# Patient Record
Sex: FEMALE | Race: WHITE | Employment: OTHER | URBAN - METROPOLITAN AREA
[De-identification: names, ages, dates, MRNs, and addresses within clinical notes are randomized per-mention and may not be internally consistent; named-entity substitution may affect disease eponyms.]

---

## 2017-04-18 ENCOUNTER — TRANSFERRED RECORDS (OUTPATIENT)
Dept: HEALTH INFORMATION MANAGEMENT | Facility: CLINIC | Age: 67
End: 2017-04-18

## 2017-04-26 ENCOUNTER — OFFICE VISIT (OUTPATIENT)
Dept: INTERNAL MEDICINE | Facility: CLINIC | Age: 67
End: 2017-04-26
Payer: COMMERCIAL

## 2017-04-26 VITALS
WEIGHT: 124 LBS | DIASTOLIC BLOOD PRESSURE: 89 MMHG | TEMPERATURE: 98.3 F | SYSTOLIC BLOOD PRESSURE: 121 MMHG | OXYGEN SATURATION: 98 % | HEART RATE: 94 BPM

## 2017-04-26 DIAGNOSIS — F17.200 TOBACCO USE DISORDER: ICD-10-CM

## 2017-04-26 DIAGNOSIS — Z87.898 HISTORY OF SEIZURE: ICD-10-CM

## 2017-04-26 DIAGNOSIS — I63.9 CEREBROVASCULAR ACCIDENT (CVA), UNSPECIFIED MECHANISM (H): ICD-10-CM

## 2017-04-26 DIAGNOSIS — C34.91 SMALL CELL LUNG CARCINOMA, RIGHT (H): Primary | ICD-10-CM

## 2017-04-26 PROCEDURE — 99204 OFFICE O/P NEW MOD 45 MIN: CPT | Performed by: INTERNAL MEDICINE

## 2017-04-26 RX ORDER — LEVETIRACETAM 500 MG/1
500 TABLET ORAL 2 TIMES DAILY
COMMUNITY

## 2017-04-26 RX ORDER — BUPROPION HYDROCHLORIDE 150 MG/1
150 TABLET, FILM COATED, EXTENDED RELEASE ORAL 2 TIMES DAILY
COMMUNITY

## 2017-04-26 RX ORDER — ONDANSETRON 4 MG/1
8 TABLET, ORALLY DISINTEGRATING ORAL EVERY 8 HOURS PRN
COMMUNITY

## 2017-04-26 RX ORDER — AMLODIPINE AND BENAZEPRIL HYDROCHLORIDE 5; 10 MG/1; MG/1
1 CAPSULE ORAL DAILY
COMMUNITY

## 2017-04-26 RX ORDER — ALBUTEROL SULFATE 90 UG/1
2 AEROSOL, METERED RESPIRATORY (INHALATION) EVERY 6 HOURS
COMMUNITY

## 2017-04-26 NOTE — NURSING NOTE
Chief Complaint   Patient presents with     Consult     establish care        Initial /89  Pulse 94  Temp 98.3  F (36.8  C) (Oral)  Wt 124 lb (56.2 kg)  SpO2 98% There is no height or weight on file to calculate BMI.  Medication Reconciliation: complete

## 2017-04-26 NOTE — PATIENT INSTRUCTIONS
Please ask your Oncologist tomorrow if Prevnar 13 is safe for you to take know. (this is a pneumonia vaccine).     Please continue to follow-up with Oncology.    You have indicated that you will try to use Nicotine gum to help you quit smoking. Please also see below and let us know if you cannot quit in the next 3-4 weeks, we can consider other options.      We advise the next visit in 3 months.      Planning to Quit Smoking  Your healthcare provider may have told you that you need to give up tobacco. Only you can decide if and when you are ready to quit. Quitting is hard to do. But the benefits will be worth it. When you  decide to quit, come up with a plan that s right for you. Discuss your plan with your healthcare provider. And talk to your provider about medicines to help you quit.    Line up support  To quit smoking, you ll need a plan and some help. Pick a date within the next 2 to 4 weeks to quit. Use the time between now and that date to arrange for support.    Classes and counselors. Quit-smoking classes  people like you through the process. Get to know others in a class, and support each other beyond the class. Telephone counseling also helps you keep on track. Ask your healthcare provider, local hospital, or public health department to put you in touch with a class and a phone counselor.    Family and friends. Tell your family and friends about your quit date. Ask them to support your change. If they smoke, arrange to see them in smoke-free places. Forbid smoking in your home.     Finding something to replace cigarettes may be hard to do. Be aware that some things you choose may be as harmful as cigarettes:    Smokeless (chewing) tobacco is just as harmful as regular tobacco. Tobacco should not be used as a substitute for cigarettes.    Herbal medicines or teas may affect how your body handles nicotine. Talk to your healthcare provider before using these products.    E-cigarettes have less toxins  than the smoke from a regular cigarette. But the FDA says that these devices may still have substances that can cause cancer. E-cigarettes are not well regulated. They have not been studied enough to know if they are a good aid to help you stop smoking. Talk with your healthcare provider befor using these products.      Quit-smoking products  Many products can help you quit smoking. Some are prescription medicines that help curb your cravings and withdrawal symptoms. Other products slowly lessen the level of nicotine your body absorbs. Nicotine is the highly addictive substance found in cigarettes, cigars, and chewing tobacco. Nicotine replacement products can help get your body used to slowly decreasing amounts of nicotine after you quit smoking. These products include a nicotine patch, gum, lozenge, nasal spray, and inhaler. Be sure you follow the directions for your medicine or product carefully. Your healthcare provider may tell you to start taking the prescription medicine a week before you plan to quit. Do not smoke while you use nicotine products. Doing so can be very harmful to your health.  For more information    smokefree.gov/guow-hv-tq-expert    National Cancer [place] Smoking Quitline: 877-44U-QUIT (316-623-6078)     0524-8317 [employer]. [address] [place] [address]. All rights reserved. This information is not intended as a substitute for professional medical care. Always follow your healthcare professional's instructions.        Tips for Quitting Smoking (Cardiovascular)  Quitting smoking is a gift to yourself, one of the best things you can do to keep your heart disease from getting worse. Smoking reduces oxygen flow to your heart, speeds the buildup of plaque, and increases your risk for heart attack, also known as acute myocardial infarction, or AMI. Quitting helps reduce smoking's harmful effects. You may have tried to quit before, but don t give up. Try again. Many  smokers try four or five times before they succeed.     You ll have the best chance of success if you join a stop-smoking group and have the support of family and friends.     Line up help    Ask for the support of your family and friends.    Join a quit-smoking class, or ask your health care provider for a referral to a psychologist who specializes in helping people quit smoking.     Ask your health care provider about nicotine replacement products and prescription medications that can help you quit.  Set a quit date    Choose a date within the next 2 to 4 weeks.    After picking a day, tatum it in bold letters on a calendar.     Your quit list  Start by giving up cigarettes at the times you least need them. Write down a few more ideas.     Set limits    Limit where you can smoke. Pick one room or a porch, and smoke only in that place.    Make smoking outdoors a house rule. Other smokers won t tempt you as much.    Hang a list of   quit benefits  in the spot where you smoke. Put one on the refrigerator and one on your car dashboard.     For more information    smokefree.gov/mxtb-ip-un-expert    National Cancer [place] Smoking Quitline: 877-44U-QUIT (622-066-6362)        7847-6451 [employer]. [address] [place] [address], [place] [address]. All rights reserved. This information is not intended as a substitute for professional medical care. Always follow your healthcare professional's instructions.

## 2017-04-26 NOTE — MR AVS SNAPSHOT
After Visit Summary   4/26/2017    Rayna Ya    MRN: [id number]           Patient Information     Date Of Birth          1950        Visit Information        Provider Department      4/26/2017 4:40 PM Bronwyn Zamorano MD [facility]        Today's Diagnoses     Cerebrovascular accident (CVA), unspecified mechanism (H)        Small cell lung carcinoma, right (H)        History of seizure          Care Instructions    Please ask your Oncologist tomorrow if Prevnar 13 is safe for you to take know. (this is a pneumonia vaccine).     Please continue to follow-up with Oncology.    You have indicated that you will try to use Nicotine gum to help you quit smoking. Please also see below and let us know if you cannot quit in the next 3-4 weeks, we can consider other options.      We advise the next visit in 3 months.      Planning to Quit Smoking  Your healthcare provider may have told you that you need to give up tobacco. Only you can decide if and when you are ready to quit. Quitting is hard to do. But the benefits will be worth it. When you  decide to quit, come up with a plan that s right for you. Discuss your plan with your healthcare provider. And talk to your provider about medicines to help you quit.    Line up support  To quit smoking, you ll need a plan and some help. Pick a date within the next 2 to 4 weeks to quit. Use the time between now and that date to arrange for support.    Classes and counselors. Quit-smoking classes  people like you through the process. Get to know others in a class, and support each other beyond the class. Telephone counseling also helps you keep on track. Ask your healthcare provider, local hospital, or public health department to put you in touch with a class and a phone counselor.    Family and friends. Tell your family and friends about your quit date. Ask them to support your change. If they smoke, arrange to see them in  smoke-free places. Forbid smoking in your home.     Finding something to replace cigarettes may be hard to do. Be aware that some things you choose may be as harmful as cigarettes:    Smokeless (chewing) tobacco is just as harmful as regular tobacco. Tobacco should not be used as a substitute for cigarettes.    Herbal medicines or teas may affect how your body handles nicotine. Talk to your healthcare provider before using these products.    E-cigarettes have less toxins than the smoke from a regular cigarette. But the FDA says that these devices may still have substances that can cause cancer. E-cigarettes are not well regulated. They have not been studied enough to know if they are a good aid to help you stop smoking. Talk with your healthcare provider befor using these products.      Quit-smoking products  Many products can help you quit smoking. Some are prescription medicines that help curb your cravings and withdrawal symptoms. Other products slowly lessen the level of nicotine your body absorbs. Nicotine is the highly addictive substance found in cigarettes, cigars, and chewing tobacco. Nicotine replacement products can help get your body used to slowly decreasing amounts of nicotine after you quit smoking. These products include a nicotine patch, gum, lozenge, nasal spray, and inhaler. Be sure you follow the directions for your medicine or product carefully. Your healthcare provider may tell you to start taking the prescription medicine a week before you plan to quit. Do not smoke while you use nicotine products. Doing so can be very harmful to your health.  For more information    smokefree.gov/seta-wb-bq-expert    National Cancer [place] Smoking Quitline: 877-44U-QUIT (575-221-8163)     1895-6165 [employer]. [address] [place] [address], [place] [address]. All rights reserved. This information is not intended as a substitute for professional medical care. Always follow your healthcare  professional's instructions.        Tips for Quitting Smoking (Cardiovascular)  Quitting smoking is a gift to yourself, one of the best things you can do to keep your heart disease from getting worse. Smoking reduces oxygen flow to your heart, speeds the buildup of plaque, and increases your risk for heart attack, also known as acute myocardial infarction, or AMI. Quitting helps reduce smoking's harmful effects. You may have tried to quit before, but don t give up. Try again. Many smokers try four or five times before they succeed.     You ll have the best chance of success if you join a stop-smoking group and have the support of family and friends.     Line up help    Ask for the support of your family and friends.    Join a quit-smoking class, or ask your health care provider for a referral to a psychologist who specializes in helping people quit smoking.     Ask your health care provider about nicotine replacement products and prescription medications that can help you quit.  Set a quit date    Choose a date within the next 2 to 4 weeks.    After picking a day, tatum it in bold letters on a calendar.     Your quit list  Start by giving up cigarettes at the times you least need them. Write down a few more ideas.     Set limits    Limit where you can smoke. Pick one room or a porch, and smoke only in that place.    Make smoking outdoors a house rule. Other smokers won t tempt you as much.    Hang a list of   quit benefits  in the spot where you smoke. Put one on the refrigerator and one on your car dashboard.     For more information    smokefree.gov/zsqu-sp-ik-expert    National Cancer [place] Smoking Quitline: 877-44U-QUIT (657-270-7799)        4682-5674 [employer]. [address] [place] [address], [place] [address]. All rights reserved. This information is not intended as a substitute for professional medical care. Always follow your healthcare professional's instructions.              Follow-ups after  "your visit        Who to contact     If you have questions or need follow up information about today's clinic visit or your schedule please contact [facility] directly at [phone].  Normal or non-critical lab and imaging results will be communicated to you by MyChart, letter or phone within 4 business days after the clinic has received the results. If you do not hear from us within 7 days, please contact the clinic through MyChart or phone. If you have a critical or abnormal lab result, we will notify you by phone as soon as possible.  Submit refill requests through [employer] or call your pharmacy and they will forward the refill request to us. Please allow 3 business days for your refill to be completed.          Additional Information About Your Visit        [employer] Information     [employer] lets you send messages to your doctor, view your test results, renew your prescriptions, schedule appointments and more. To sign up, go to [email or web address] . Click on \"Log in\" on the left side of the screen, which will take you to the Welcome page. Then click on \"Sign up Now\" on the right side of the page.     You will be asked to enter the access code listed below, as well as some personal information. Please follow the directions to create your username and password.     Your access code is: [email or web address]  Expires: 2017  5:45 PM     Your access code will  in 90 days. If you need help or a new code, please call your [place] clinic or [phone].        Care EveryWhere ID     This is your Care EveryWhere ID. This could be used by other organizations to access your [place] medical records  [id number]        Your Vitals Were     Pulse Temperature Pulse Oximetry             94 98.3  F (36.8  C) (Oral) 98%          Blood Pressure from Last 3 Encounters:   17 121/89   14 (!) 151/94    Weight from Last 3 Encounters:   17 124 lb (56.2 kg)              Today, you had the following "     No orders found for display         Today's Medication Changes          These changes are accurate as of: 4/26/17  5:45 PM.  If you have any questions, ask your nurse or doctor.               These medicines have changed or have updated prescriptions.        Dose/Directions    acetaminophen 325 MG tablet   Commonly known as:  TYLENOL   This may have changed:  Another medication with the same name was removed. Continue taking this medication, and follow the directions you see here.        Dose:  1-2 tablet   Take 1-2 tablets by mouth every 4 hours as needed   Refills:  0         Stop taking these medicines if you haven't already. Please contact your care team if you have questions.     aspirin 81 MG tablet   Stopped by:  Bronwyn Zamorano MD           CEPACOL MT   Stopped by:  Bronwyn Zamorano MD           CITRUCEL PO   Stopped by:  Bronwyn Zamorano MD           guaiFENesin 20 mg/mL Soln solution   Commonly known as:  ROBITUSSIN   Stopped by:  Bronwyn Zamorano MD           lisinopril 20 MG tablet   Commonly known as:  PRINIVIL/ZESTRIL   Stopped by:  Bronwyn Zamorano MD           LOPERAMIDE A-D 2 MG tablet   Generic drug:  loperamide   Stopped by:  Bronwyn Zamorano MD           loratadine 10 MG tablet   Commonly known as:  CLARITIN   Stopped by:  Bronwyn Zamorano MD           MAG-AL PLUS XS PO   Stopped by:  Bronwyn Zamorano MD           MILK OF MAGNESIA PO   Stopped by:  Bronwyn Zamorano MD           MULTIVITAMIN PO   Stopped by:  Bronwyn Zamorano MD           oxybutynin 10 MG 24 hr tablet   Commonly known as:  DITROPAN XL   Stopped by:  Bronwyn Zamorano MD           PAXIL 40 MG tablet   Generic drug:  PARoxetine   Stopped by:  Bronwyn Zamorano MD           simvastatin 40 MG tablet   Commonly known as:  ZOCOR   Stopped by:  Bronwyn Zamorano MD           STOOL  SOFTENER 100 MG capsule   Generic drug:  docusate sodium   Stopped by:  Bronwyn Zamorano MD           triamterene-hydrochlorothiazide 37.5-25 MG per tablet   Commonly known as:  MAXZIDE-25   Stopped by:  Bronwyn Zamorano MD                    Primary Care Provider    None Specified       No primary provider on file.        Thank you!     Thank you for choosing [facility]  [facility] your [facility]. Our goal is always to provide you with excellent care. Hearing back from our patients is one way we can continue to improve our services. Please take a few minutes to complete the written survey that you may receive in the mail after your visit with us. Thank you!             Your Updated Medication List - Protect others around you: Learn how to safely use, store and throw away your medicines at www.disposemymeds.org.          This list is accurate as of: 4/26/17  5:45 PM.  Always use your most recent med list.                   Brand Name Dispense Instructions for use    acetaminophen 325 MG tablet    TYLENOL     Take 1-2 tablets by mouth every 4 hours as needed       albuterol 108 (90 BASE) MCG/ACT Inhaler    PROAIR HFA/PROVENTIL HFA/VENTOLIN HFA     Inhale 2 puffs into the lungs every 6 hours       amLODIPine-benazepril 5-10 MG per capsule    LOTREL     Take 1 capsule by mouth daily       buPROPion 150 MG 12 hr tablet    ZYBAN     Take 150 mg by mouth 2 times daily       levETIRAcetam 500 MG tablet    KEPPRA     Take 500 mg by mouth 2 times daily       LIPITOR PO          MIRTAZAPINE PO      Take 15 mg by mouth At Bedtime       ondansetron 4 MG ODT tab    ZOFRAN-ODT     Take 8 mg by mouth every 8 hours as needed for nausea       PROCHLORPERAZINE MALEATE PO      Take 10 mg by mouth       TOPROL  MG 24 hr tablet   Generic drug:  metoprolol      Take 100 mg by mouth daily       VITAMIN D (CHOLECALCIFEROL) PO      Take 2,000 Units by mouth daily

## 2017-04-26 NOTE — PROGRESS NOTES
SUBJECTIVE:                                                    Rayna Ya is a 66 year old female who presents to clinic today for the following health issues:      Looking for a new pcp to help with medications.  Daughter is here and helps provide details of the patient's PMH.       SCC of the lung, recently diagnosed.  History of past ETOH use.  She was dxed with lung cancer (with small cell lung cancer) a few weeks ago, and finished her last chemo cycle of Etoposide and Carboplatin about 10 days ago.  She is getting treatment through [facility].  She tolerated this well.     She is already feeling better.  She is a current smoker and has just bought nicotine gum.  She smokes about 3/4 of a pack per day.  She has labs tomorrow. She is due to start another round of chemo on 5/10/17.   She has no f/c.  She does endorse diarrhea-or actually, just loose stools about twice a day.  No nausea.   History of Wernicke Korsakoff syndrome-last ETOH was 2 months ago in [place]-was advised to avoid completely.     Problem list and histories reviewed & adjusted, as indicated.  Additional history: as documented    Patient Active Problem List   Diagnosis     Chronic obstructive pulmonary disease (H)     Chronic kidney disease, stage III (moderate)     Essential hypertension     Hyperlipidemia     Moderate episode of recurrent major depressive disorder (H)     Paroxysmal atrial fibrillation (H)     Tobacco use     Anxiety state     Anemia     Atopic rhinitis     Stroke (H)     Mild cognitive disorder     Small cell lung carcinoma, right (H)     History of seizure     Past Surgical History:   Procedure Laterality Date     HYSTERECTOMY         Social History   Substance Use Topics     Smoking status: Former Smoker     Packs/day: 1.00     Types: Cigarettes     Quit date: 1/7/2014     Smokeless tobacco: Never Used     Alcohol use Not on file     Family History   Problem Relation Age of Onset     Depression Mother      Anxiety  Disorder Mother      Substance Abuse Father      Substance Abuse Maternal Grandfather      Substance Abuse Paternal Grandfather      Substance Abuse Brother          Current Outpatient Prescriptions   Medication Sig Dispense Refill     MIRTAZAPINE PO Take 15 mg by mouth At Bedtime       levETIRAcetam (KEPPRA) 500 MG tablet Take 500 mg by mouth 2 times daily       buPROPion (ZYBAN) 150 MG 12 hr tablet Take 150 mg by mouth 2 times daily       amLODIPine-benazepril (LOTREL) 5-10 MG per capsule Take 1 capsule by mouth daily       albuterol (PROAIR HFA/PROVENTIL HFA/VENTOLIN HFA) 108 (90 BASE) MCG/ACT Inhaler Inhale 2 puffs into the lungs every 6 hours       Atorvastatin Calcium (LIPITOR PO)        VITAMIN D, CHOLECALCIFEROL, PO Take 2,000 Units by mouth daily       PROCHLORPERAZINE MALEATE PO Take 10 mg by mouth       ondansetron (ZOFRAN-ODT) 4 MG ODT tab Take 8 mg by mouth every 8 hours as needed for nausea       acetaminophen (TYLENOL) 325 MG tablet Take 1-2 tablets by mouth every 4 hours as needed       metoprolol (TOPROL XL) 100 MG 24 hr tablet Take 100 mg by mouth daily         Reviewed and updated as needed this visit by clinical staff  Tobacco  Allergies  Meds  Med Hx  Surg Hx  Fam Hx  Soc Hx      Reviewed and updated as needed this visit by Provider           ==============================================================  ROS:  Constitutional, HEENT, cardiovascular, pulmonary, GI, , musculoskeletal, neuro, skin, endocrine and psych systems are negative, except as otherwise noted.       OBJECTIVE:                                                    /89  Pulse 94  Temp 98.3  F (36.8  C) (Oral)  Wt 124 lb (56.2 kg)  SpO2 98%  There is no height or weight on file to calculate BMI.     GENERAL APPEARANCE: healthy, alert and in no distress  EYES: Eyes grossly normal to inspection, and conjunctivae and sclerae normal  HENT: head normocephalic and atraumatic and mouth without ulcers or lesions,  oropharynx clear and oral mucous membranes moist  NECK: no noticeable adenopathy, no asymmetry, masses, or scars   RESP:   lungs clear to auscultation - no rales, rhonchi or wheezes  CV: regular rate and rhythm, normal S1 S2, no S3 or S4, no murmur, click or rub, no peripheral edema and peripheral pulses strong  ABDOMEN: soft, nontender, no hepatosplenomegaly, no masses and bowel sounds normal  MS: no musculoskeletal defects are noted and gait is age appropriate without ataxia  SKIN: no suspicious lesions or rashes  NEURO: mentation intact and speech normal  PSYCH: mentation appears normal and affect normal/bright.          ASSESSMENT/PLAN:                                                        ICD-10-CM    1. Small cell lung carcinoma, right (H) C34.91    2. Tobacco use disorder F17.200    3. Cerebrovascular accident (CVA), unspecified mechanism (H) I63.9    4. History of seizure Z87.898      (C34.91) Small cell lung carcinoma, right (H)  (primary encounter diagnosis)  Comment: recently dxed-see HPI  Plan: continue follow-up with specialists    (F17.200) Tobacco use disorder  Comment: as noted, with recent lung SCC dx  Plan: As per orders above and patient instructions below.     (I63.9) Cerebrovascular accident (CVA), unspecified mechanism (H)  Comment: in the past  Plan: Continue current regimen.     (Z87.898) History of seizure  Comment: remote (perhaps related to ETOH use in the past)-no recent ETOH use  Plan: Continue current regimen.        Patient Instructions     Please ask your Oncologist tomorrow if Prevnar 13 is safe for you to take know. (this is a pneumonia vaccine).     Please continue to follow-up with Oncology.    You have indicated that you will try to use Nicotine gum to help you quit smoking. Please also see below and let us know if you cannot quit in the next 3-4 weeks, we can consider other options.      We advise the next visit in 3 months.      Planning to Quit Smoking  Your healthcare  provider may have told you that you need to give up tobacco. Only you can decide if and when you are ready to quit. Quitting is hard to do. But the benefits will be worth it. When you  decide to quit, come up with a plan that s right for you. Discuss your plan with your healthcare provider. And talk to your provider about medicines to help you quit.    Line up support  To quit smoking, you ll need a plan and some help. Pick a date within the next 2 to 4 weeks to quit. Use the time between now and that date to arrange for support.    Classes and counselors. Quit-smoking classes  people like you through the process. Get to know others in a class, and support each other beyond the class. Telephone counseling also helps you keep on track. Ask your healthcare provider, local hospital, or public health department to put you in touch with a class and a phone counselor.    Family and friends. Tell your family and friends about your quit date. Ask them to support your change. If they smoke, arrange to see them in smoke-free places. Forbid smoking in your home.     Finding something to replace cigarettes may be hard to do. Be aware that some things you choose may be as harmful as cigarettes:    Smokeless (chewing) tobacco is just as harmful as regular tobacco. Tobacco should not be used as a substitute for cigarettes.    Herbal medicines or teas may affect how your body handles nicotine. Talk to your healthcare provider before using these products.    E-cigarettes have less toxins than the smoke from a regular cigarette. But the FDA says that these devices may still have substances that can cause cancer. E-cigarettes are not well regulated. They have not been studied enough to know if they are a good aid to help you stop smoking. Talk with your healthcare provider befor using these products.      Quit-smoking products  Many products can help you quit smoking. Some are prescription medicines that help curb your cravings  and withdrawal symptoms. Other products slowly lessen the level of nicotine your body absorbs. Nicotine is the highly addictive substance found in cigarettes, cigars, and chewing tobacco. Nicotine replacement products can help get your body used to slowly decreasing amounts of nicotine after you quit smoking. These products include a nicotine patch, gum, lozenge, nasal spray, and inhaler. Be sure you follow the directions for your medicine or product carefully. Your healthcare provider may tell you to start taking the prescription medicine a week before you plan to quit. Do not smoke while you use nicotine products. Doing so can be very harmful to your health.  For more information    smokefree.gov/dweg-te-va-expert    National Cancer [place] Smoking Quitline: 877-44U-QUIT (328-987-2186)     8014-3252 [employer]. [address] [place], [place] [address]. All rights reserved. This information is not intended as a substitute for professional medical care. Always follow your healthcare professional's instructions.        Tips for Quitting Smoking (Cardiovascular)  Quitting smoking is a gift to yourself, one of the best things you can do to keep your heart disease from getting worse. Smoking reduces oxygen flow to your heart, speeds the buildup of plaque, and increases your risk for heart attack, also known as acute myocardial infarction, or AMI. Quitting helps reduce smoking's harmful effects. You may have tried to quit before, but don t give up. Try again. Many smokers try four or five times before they succeed.     You ll have the best chance of success if you join a stop-smoking group and have the support of family and friends.     Line up help    Ask for the support of your family and friends.    Join a quit-smoking class, or ask your health care provider for a referral to a psychologist who specializes in helping people quit smoking.     Ask your health care provider about nicotine replacement  products and prescription medications that can help you quit.  Set a quit date    Choose a date within the next 2 to 4 weeks.    After picking a day, tatum it in bold letters on a calendar.     Your quit list  Start by giving up cigarettes at the times you least need them. Write down a few more ideas.     Set limits    Limit where you can smoke. Pick one room or a porch, and smoke only in that place.    Make smoking outdoors a house rule. Other smokers won t tempt you as much.    Hang a list of   quit benefits  in the spot where you smoke. Put one on the refrigerator and one on your car dashboard.     For more information    smokefree.gov/xyhu-ec-sb-expert    National Cancer [place] Smoking Quitline: 877-44U-QUIT (667-535-7993)        7146-5026 [employer]. [address] [place] [address]. All rights reserved. This information is not intended as a substitute for professional medical care. Always follow your healthcare professional's instructions.                        Bronwyn Zamorano MD  [facility]

## 2017-04-27 ENCOUNTER — TRANSFERRED RECORDS (OUTPATIENT)
Dept: HEALTH INFORMATION MANAGEMENT | Facility: CLINIC | Age: 67
End: 2017-04-27

## 2017-04-27 ASSESSMENT — PATIENT HEALTH QUESTIONNAIRE - PHQ9: SUM OF ALL RESPONSES TO PHQ QUESTIONS 1-9: 5

## 2017-11-16 ENCOUNTER — TELEPHONE (OUTPATIENT)
Dept: INTERNAL MEDICINE | Facility: CLINIC | Age: 67
End: 2017-11-16

## 2017-11-16 NOTE — LETTER
Rayna Ya  [id number] [facility] [id number]      November 27, 2017          Lele Ya      Our records indicate that you have not scheduled for a(n)Mammogram which was recommended by your health care team. Monitoring and managing your preventative and chronic health conditions are very important to us.       If you have received your health care elsewhere, please provide us with that information so it can be documented in your chart.    Please call [phone] or message us through your [employer] account to schedule an appointment or provide information for your chart.     We look forward to seeing you and working with you on your health care needs.     Sincerely,   Bronwyn Zamorano MD/aiyana          *If you have already scheduled an appointment, please disregard this reminder